# Patient Record
(demographics unavailable — no encounter records)

---

## 2024-12-27 NOTE — PHYSICAL EXAM
[Normal Venous Pressure] : normal venous pressure [Normal S1, S2] : normal S1, S2 [Clear Lung Fields] : clear lung fields [Soft] : abdomen soft [No Edema] : no edema [de-identified] : rrr

## 2024-12-27 NOTE — DISCUSSION/SUMMARY
[FreeTextEntry1] : pt with risk factors family h/o  abn ekg with ? new onset rbbb/lafb  get old ekgs from last year  increase hydration as low CO increase cardio exercise continue statin  continue Zetia Blood work  4 months

## 2025-04-02 NOTE — HISTORY OF PRESENT ILLNESS
Medication: levothyroxine passed protocol.   Last office visit date: 2/26/2025  Next appointment scheduled?: Yes   Number of refills given: 3    Last OV Plan of care:     levothyroxine 112 MCG tablet Take 1 tablet by mouth daily. 90 tablet 3     Last Refill:  8/21/2024  Number of Refills Sent:  3  Quantity of Medication Given:  90 day supply      [FreeTextEntry1] : Pt with Abnormal EKG, RBBB, LAFB, family h/o father  of CVA in late 40's, uncles 8 on dads side with heart problems, HLD, HTN, chronic low HDL   pt says goes on elliptical and does 30 minutes 4 times/week without cp or sob. denies syncope, denies palpitations, denies pnd or orthopnea. pt may have sleep apnea and being w/u on Monday with sleep study.  4: HDL 26, LDL 93, A1c: 5  9/3/24: HDL: 26, LDL: 93, T 24: EF: 64%, Ivsd/lvpw: 1.1 cm, lvot vti: 21.1 cm, CO: 2.8 l/min,. DD1, mild MR, borderline LVH  24: Right: proximal ICA <50% stenosis. Left: proximal ICA <50% stenosis. Vertebral arteries: antegrade flow bilaterally. Subclavian arteries: no significant atherosclerosis bilaterally. Mild calcified and noncalcified atherosclerosis b/l ica. Patient states spironolactone and metoprolol switched to Carvedilol. Patient endorses he doesn't drink enough water and patient walking about 2 miles daily.   24: 24: PAULINO Rice from [facility] called office stating BP was very high 169/113 and 193/111. Dr. Whipple stated to proceed with CCTA.  Non-obstructive coronary artery disease. The total Agatston coronary artery calcium score equals 61, which corresponds to 88th percentile for age, gender and ethnicity. CAD-RADS2/P2, Consider risk factor modification and preventive pharmacotherapy. Results given by NP on  patient advised to check lipids prior to f/u on  and will discuss medication adjustments.  Patient did not get blood work done due to death in the family. Patient does 30-60 minutes of cardio daily elliptical or bike. The patient denies CP, bleeding, SOB at rest, PND, abdominal discomfort, LH/dizziness, BLE edema, palpitations, and syncope. Patient ok to take Adderall.

## 2025-04-07 NOTE — HISTORY OF PRESENT ILLNESS
[FreeTextEntry1] : Pt with Abnormal EKG, RBBB, LAFB, family h/o father  of CVA in late 40's, uncles 8 on dads side with heart problems, HLD, HTN, chronic low HDL   pt says goes on elliptical and does 30 minutes 4 times/week without cp or sob. denies syncope, denies palpitations, denies pnd or orthopnea. pt may have sleep apnea and being w/u on Monday with sleep study.  4: HDL 26, LDL 93, A1c: 5  9/3/24: HDL: 26, LDL: 93, T 24: EF: 64%, Ivsd/lvpw: 1.1 cm, lvot vti: 21.1 cm, CO: 2.8 l/min,. DD1, mild MR, borderline LVH  24: Right: proximal ICA <50% stenosis. Left: proximal ICA <50% stenosis. Vertebral arteries: antegrade flow bilaterally. Subclavian arteries: no significant atherosclerosis bilaterally. Mild calcified and noncalcified atherosclerosis b/l ica. Patient states spironolactone and metoprolol switched to Carvedilol. Patient endorses he doesn't drink enough water and patient walking about 2 miles daily.   24: 24: PAULINO Rice from [facility] called office stating BP was very high 169/113 and 193/111. Dr. Whipple stated to proceed with CCTA.  Non-obstructive coronary artery disease. The total Agatston coronary artery calcium score equals 61, which corresponds to 88th percentile for age, gender and ethnicity. CAD-RADS2/P2, Consider risk factor modification and preventive pharmacotherapy. Results given by NP on  patient advised to check lipids prior to f/u on  and will discuss medication adjustments.  Patient did not get blood work done due to death in the family. Patient does 30-60 minutes of cardio daily elliptical or bike. The patient denies CP, bleeding, SOB at rest, PND, abdominal discomfort, LH/dizziness, BLE edema, palpitations, and syncope. Patient ok to take Adderall.   25: Feeling well. no chest pain pressure or palpitations. He has increased his exercise to daily.  3/17/25: apob: 66, ldl: 56, lpa: 15

## 2025-04-07 NOTE — HISTORY OF PRESENT ILLNESS
[FreeTextEntry1] : Pt with Abnormal EKG, RBBB, LAFB, family h/o father  of CVA in late 40's, uncles 8 on dads side with heart problems, HLD, HTN, chronic low HDL   pt says goes on elliptical and does 30 minutes 4 times/week without cp or sob. denies syncope, denies palpitations, denies pnd or orthopnea. pt may have sleep apnea and being w/u on Monday with sleep study.  4: HDL 26, LDL 93, A1c: 5  9/3/24: HDL: 26, LDL: 93, T 24: EF: 64%, Ivsd/lvpw: 1.1 cm, lvot vti: 21.1 cm, CO: 2.8 l/min,. DD1, mild MR, borderline LVH  24: Right: proximal ICA <50% stenosis. Left: proximal ICA <50% stenosis. Vertebral arteries: antegrade flow bilaterally. Subclavian arteries: no significant atherosclerosis bilaterally. Mild calcified and noncalcified atherosclerosis b/l ica. Patient states spironolactone and metoprolol switched to Carvedilol. Patient endorses he doesn't drink enough water and patient walking about 2 miles daily.   24: 24: PAULINO Rice from [facility] called office stating BP was very high 169/113 and 193/111. Dr. Whipple stated to proceed with CCTA.  Non-obstructive coronary artery disease. The total Agatston coronary artery calcium score equals 61, which corresponds to 88th percentile for age, gender and ethnicity. CAD-RADS2/P2, Consider risk factor modification and preventive pharmacotherapy. Results given by NP on  patient advised to check lipids prior to f/u on  and will discuss medication adjustments.  Patient did not get blood work done due to death in the family. Patient does 30-60 minutes of cardio daily elliptical or bike. The patient denies CP, bleeding, SOB at rest, PND, abdominal discomfort, LH/dizziness, BLE edema, palpitations, and syncope. Patient ok to take Adderall.   25: Feeling well. no chest pain pressure or palpitations. He has increased his exercise to daily.  3/17/25: apob: 66, ldl: 56, lpa: 15

## 2025-04-07 NOTE — ASSESSMENT
[FreeTextEntry1] :  This is a 48 year old pmg HLD, strong family hx    Assessment # CAD    NOS  #  HLD  #ABN EKG # fam hx of CAD    Plan  c/w coreg/ losartan   had s/e with ozempic now off  will change atorvastatin to rosuvastatin  discussed genetic role of HLD/ he may be interested in the future of consult with lipid center in future